# Patient Record
Sex: MALE | Employment: OTHER | URBAN - METROPOLITAN AREA
[De-identification: names, ages, dates, MRNs, and addresses within clinical notes are randomized per-mention and may not be internally consistent; named-entity substitution may affect disease eponyms.]

---

## 2023-11-05 ENCOUNTER — APPOINTMENT (OUTPATIENT)
Dept: CT IMAGING | Age: 88
DRG: 101 | End: 2023-11-05
Payer: MEDICARE

## 2023-11-05 ENCOUNTER — HOSPITAL ENCOUNTER (INPATIENT)
Age: 88
LOS: 1 days | Discharge: HOME | DRG: 101 | End: 2023-11-06
Attending: EMERGENCY MEDICINE | Admitting: HOSPITALIST
Payer: MEDICARE

## 2023-11-05 DIAGNOSIS — R56.9 POSTICTAL STATE (HCC): ICD-10-CM

## 2023-11-05 DIAGNOSIS — G31.9 DIFFUSE CEREBRAL ATROPHY (HCC): ICD-10-CM

## 2023-11-05 DIAGNOSIS — I67.89 CEREBRAL MICROVASCULAR DISEASE: ICD-10-CM

## 2023-11-05 DIAGNOSIS — R55 SYNCOPE AND COLLAPSE: Primary | ICD-10-CM

## 2023-11-05 DIAGNOSIS — R56.9 NEW ONSET SEIZURE (HCC): ICD-10-CM

## 2023-11-05 LAB
ALBUMIN SERPL-MCNC: 4.4 G/DL (ref 3.5–5.2)
ALBUMIN/GLOB SERPL: 1.6 {RATIO} (ref 1–2.5)
ALP SERPL-CCNC: 65 U/L (ref 40–129)
ALT SERPL-CCNC: 15 U/L (ref 5–41)
ANION GAP SERPL CALCULATED.3IONS-SCNC: 15 MMOL/L (ref 9–17)
AST SERPL-CCNC: 19 U/L
BACTERIA URNS QL MICRO: ABNORMAL
BASOPHILS # BLD: 0 K/UL (ref 0–0.2)
BASOPHILS NFR BLD: 0 % (ref 0–2)
BILIRUB SERPL-MCNC: 0.8 MG/DL (ref 0.3–1.2)
BILIRUB UR QL STRIP: NEGATIVE
BUN SERPL-MCNC: 17 MG/DL (ref 8–23)
CALCIUM SERPL-MCNC: 9.5 MG/DL (ref 8.6–10.4)
CHARACTER UR: ABNORMAL
CHLORIDE SERPL-SCNC: 104 MMOL/L (ref 98–107)
CLARITY UR: CLEAR
CO2 SERPL-SCNC: 25 MMOL/L (ref 20–31)
COLOR UR: YELLOW
CREAT SERPL-MCNC: 1 MG/DL (ref 0.7–1.2)
EOSINOPHIL # BLD: 0.1 K/UL (ref 0–0.4)
EOSINOPHILS RELATIVE PERCENT: 2 % (ref 1–4)
EPI CELLS #/AREA URNS HPF: ABNORMAL /HPF (ref 0–5)
ERYTHROCYTE [DISTWIDTH] IN BLOOD BY AUTOMATED COUNT: 13.7 % (ref 12.5–15.4)
GFR SERPL CREATININE-BSD FRML MDRD: >60 ML/MIN/1.73M2
GLUCOSE BLD-MCNC: 131 MG/DL (ref 75–110)
GLUCOSE SERPL-MCNC: 179 MG/DL (ref 70–99)
GLUCOSE UR STRIP-MCNC: ABNORMAL MG/DL
HCT VFR BLD AUTO: 40 % (ref 41–53)
HGB BLD-MCNC: 13.4 G/DL (ref 13.5–17.5)
HGB UR QL STRIP.AUTO: NEGATIVE
KETONES UR STRIP-MCNC: ABNORMAL MG/DL
LEUKOCYTE ESTERASE UR QL STRIP: NEGATIVE
LYMPHOCYTES NFR BLD: 3.1 K/UL (ref 1–4.8)
LYMPHOCYTES RELATIVE PERCENT: 39 % (ref 24–44)
MCH RBC QN AUTO: 31.8 PG (ref 26–34)
MCHC RBC AUTO-ENTMCNC: 33.5 G/DL (ref 31–37)
MCV RBC AUTO: 95 FL (ref 80–100)
MONOCYTES NFR BLD: 0.6 K/UL (ref 0.1–1.2)
MONOCYTES NFR BLD: 7 % (ref 2–11)
NEUTROPHILS NFR BLD: 52 % (ref 36–66)
NEUTS SEG NFR BLD: 4.1 K/UL (ref 1.8–7.7)
NITRITE UR QL STRIP: NEGATIVE
PH UR STRIP: 6 [PH] (ref 5–8)
PLATELET # BLD AUTO: 163 K/UL (ref 140–450)
PMV BLD AUTO: 9.3 FL (ref 6–12)
POTASSIUM SERPL-SCNC: 3.9 MMOL/L (ref 3.7–5.3)
PROT SERPL-MCNC: 7.2 G/DL (ref 6.4–8.3)
PROT UR STRIP-MCNC: ABNORMAL MG/DL
RBC # BLD AUTO: 4.21 M/UL (ref 4.5–5.9)
RBC #/AREA URNS HPF: ABNORMAL /HPF (ref 0–2)
SODIUM SERPL-SCNC: 144 MMOL/L (ref 135–144)
SP GR UR STRIP: 1.01 (ref 1–1.03)
TROPONIN I SERPL HS-MCNC: 10 NG/L (ref 0–22)
TSH SERPL DL<=0.05 MIU/L-ACNC: 6.01 UIU/ML (ref 0.3–5)
UROBILINOGEN UR STRIP-ACNC: NORMAL EU/DL (ref 0–1)
WBC #/AREA URNS HPF: ABNORMAL /HPF (ref 0–5)
WBC OTHER # BLD: 7.9 K/UL (ref 3.5–11)

## 2023-11-05 PROCEDURE — 82947 ASSAY GLUCOSE BLOOD QUANT: CPT

## 2023-11-05 PROCEDURE — 70450 CT HEAD/BRAIN W/O DYE: CPT

## 2023-11-05 PROCEDURE — 2060000000 HC ICU INTERMEDIATE R&B

## 2023-11-05 PROCEDURE — 84443 ASSAY THYROID STIM HORMONE: CPT

## 2023-11-05 PROCEDURE — 36415 COLL VENOUS BLD VENIPUNCTURE: CPT

## 2023-11-05 PROCEDURE — 84484 ASSAY OF TROPONIN QUANT: CPT

## 2023-11-05 PROCEDURE — 99285 EMERGENCY DEPT VISIT HI MDM: CPT

## 2023-11-05 PROCEDURE — 2580000003 HC RX 258: Performed by: NURSE PRACTITIONER

## 2023-11-05 PROCEDURE — 6360000004 HC RX CONTRAST MEDICATION: Performed by: EMERGENCY MEDICINE

## 2023-11-05 PROCEDURE — 84439 ASSAY OF FREE THYROXINE: CPT

## 2023-11-05 PROCEDURE — 2580000003 HC RX 258: Performed by: HOSPITALIST

## 2023-11-05 PROCEDURE — 81001 URINALYSIS AUTO W/SCOPE: CPT

## 2023-11-05 PROCEDURE — 2580000003 HC RX 258: Performed by: EMERGENCY MEDICINE

## 2023-11-05 PROCEDURE — 80053 COMPREHEN METABOLIC PANEL: CPT

## 2023-11-05 PROCEDURE — 96374 THER/PROPH/DIAG INJ IV PUSH: CPT

## 2023-11-05 PROCEDURE — 71260 CT THORAX DX C+: CPT

## 2023-11-05 PROCEDURE — 93005 ELECTROCARDIOGRAM TRACING: CPT | Performed by: NURSE PRACTITIONER

## 2023-11-05 PROCEDURE — 85025 COMPLETE CBC W/AUTO DIFF WBC: CPT

## 2023-11-05 PROCEDURE — 6360000002 HC RX W HCPCS: Performed by: HOSPITALIST

## 2023-11-05 PROCEDURE — 6360000002 HC RX W HCPCS: Performed by: NURSE PRACTITIONER

## 2023-11-05 RX ORDER — BICALUTAMIDE 50 MG/1
50 TABLET, FILM COATED ORAL DAILY
Status: DISCONTINUED | OUTPATIENT
Start: 2023-11-06 | End: 2023-11-07 | Stop reason: HOSPADM

## 2023-11-05 RX ORDER — SODIUM CHLORIDE 0.9 % (FLUSH) 0.9 %
10 SYRINGE (ML) INJECTION ONCE
Status: COMPLETED | OUTPATIENT
Start: 2023-11-05 | End: 2023-11-05

## 2023-11-05 RX ORDER — ONDANSETRON 4 MG/1
4 TABLET, ORALLY DISINTEGRATING ORAL EVERY 8 HOURS PRN
Status: DISCONTINUED | OUTPATIENT
Start: 2023-11-05 | End: 2023-11-07 | Stop reason: HOSPADM

## 2023-11-05 RX ORDER — ENOXAPARIN SODIUM 100 MG/ML
40 INJECTION SUBCUTANEOUS DAILY
Status: DISCONTINUED | OUTPATIENT
Start: 2023-11-05 | End: 2023-11-07 | Stop reason: HOSPADM

## 2023-11-05 RX ORDER — SODIUM CHLORIDE 9 MG/ML
INJECTION, SOLUTION INTRAVENOUS PRN
Status: DISCONTINUED | OUTPATIENT
Start: 2023-11-05 | End: 2023-11-07 | Stop reason: HOSPADM

## 2023-11-05 RX ORDER — MAGNESIUM SULFATE IN WATER 40 MG/ML
2000 INJECTION, SOLUTION INTRAVENOUS PRN
Status: DISCONTINUED | OUTPATIENT
Start: 2023-11-05 | End: 2023-11-07 | Stop reason: HOSPADM

## 2023-11-05 RX ORDER — POTASSIUM CHLORIDE 7.45 MG/ML
10 INJECTION INTRAVENOUS PRN
Status: DISCONTINUED | OUTPATIENT
Start: 2023-11-05 | End: 2023-11-07 | Stop reason: HOSPADM

## 2023-11-05 RX ORDER — POTASSIUM CHLORIDE 20 MEQ/1
40 TABLET, EXTENDED RELEASE ORAL PRN
Status: DISCONTINUED | OUTPATIENT
Start: 2023-11-05 | End: 2023-11-07 | Stop reason: HOSPADM

## 2023-11-05 RX ORDER — POLYETHYLENE GLYCOL 3350 17 G/17G
17 POWDER, FOR SOLUTION ORAL DAILY PRN
Status: DISCONTINUED | OUTPATIENT
Start: 2023-11-05 | End: 2023-11-07 | Stop reason: HOSPADM

## 2023-11-05 RX ORDER — ONDANSETRON 2 MG/ML
4 INJECTION INTRAMUSCULAR; INTRAVENOUS EVERY 6 HOURS PRN
Status: DISCONTINUED | OUTPATIENT
Start: 2023-11-05 | End: 2023-11-07 | Stop reason: HOSPADM

## 2023-11-05 RX ORDER — SODIUM CHLORIDE 0.9 % (FLUSH) 0.9 %
5-40 SYRINGE (ML) INJECTION PRN
Status: DISCONTINUED | OUTPATIENT
Start: 2023-11-05 | End: 2023-11-07 | Stop reason: HOSPADM

## 2023-11-05 RX ORDER — LORAZEPAM 2 MG/ML
1 INJECTION INTRAMUSCULAR EVERY 5 MIN PRN
Status: DISCONTINUED | OUTPATIENT
Start: 2023-11-05 | End: 2023-11-07 | Stop reason: HOSPADM

## 2023-11-05 RX ORDER — 0.9 % SODIUM CHLORIDE 0.9 %
80 INTRAVENOUS SOLUTION INTRAVENOUS ONCE
Status: DISCONTINUED | OUTPATIENT
Start: 2023-11-05 | End: 2023-11-07 | Stop reason: HOSPADM

## 2023-11-05 RX ORDER — 0.9 % SODIUM CHLORIDE 0.9 %
500 INTRAVENOUS SOLUTION INTRAVENOUS ONCE
Status: COMPLETED | OUTPATIENT
Start: 2023-11-05 | End: 2023-11-05

## 2023-11-05 RX ORDER — ONDANSETRON 2 MG/ML
4 INJECTION INTRAMUSCULAR; INTRAVENOUS ONCE
Status: COMPLETED | OUTPATIENT
Start: 2023-11-05 | End: 2023-11-05

## 2023-11-05 RX ORDER — BICALUTAMIDE 50 MG/1
TABLET, FILM COATED ORAL
COMMUNITY
Start: 2021-08-30

## 2023-11-05 RX ORDER — SODIUM CHLORIDE 0.9 % (FLUSH) 0.9 %
5-40 SYRINGE (ML) INJECTION EVERY 12 HOURS SCHEDULED
Status: DISCONTINUED | OUTPATIENT
Start: 2023-11-05 | End: 2023-11-07 | Stop reason: HOSPADM

## 2023-11-05 RX ORDER — ACETAMINOPHEN 325 MG/1
650 TABLET ORAL EVERY 6 HOURS PRN
Status: DISCONTINUED | OUTPATIENT
Start: 2023-11-05 | End: 2023-11-07 | Stop reason: HOSPADM

## 2023-11-05 RX ORDER — ACETAMINOPHEN 650 MG/1
650 SUPPOSITORY RECTAL EVERY 6 HOURS PRN
Status: DISCONTINUED | OUTPATIENT
Start: 2023-11-05 | End: 2023-11-07 | Stop reason: HOSPADM

## 2023-11-05 RX ADMIN — IOPAMIDOL 75 ML: 755 INJECTION, SOLUTION INTRAVENOUS at 15:53

## 2023-11-05 RX ADMIN — SODIUM CHLORIDE 500 ML: 9 INJECTION, SOLUTION INTRAVENOUS at 14:30

## 2023-11-05 RX ADMIN — SODIUM CHLORIDE, PRESERVATIVE FREE 10 ML: 5 INJECTION INTRAVENOUS at 19:48

## 2023-11-05 RX ADMIN — Medication 80 ML: at 15:53

## 2023-11-05 RX ADMIN — SODIUM CHLORIDE, PRESERVATIVE FREE 10 ML: 5 INJECTION INTRAVENOUS at 15:53

## 2023-11-05 RX ADMIN — ONDANSETRON 4 MG: 2 INJECTION INTRAMUSCULAR; INTRAVENOUS at 14:40

## 2023-11-05 RX ADMIN — ENOXAPARIN SODIUM 40 MG: 100 INJECTION SUBCUTANEOUS at 19:52

## 2023-11-05 ASSESSMENT — ENCOUNTER SYMPTOMS
NAUSEA: 0
ABDOMINAL PAIN: 0
DIARRHEA: 0
SHORTNESS OF BREATH: 0
VOMITING: 0

## 2023-11-05 ASSESSMENT — PAIN - FUNCTIONAL ASSESSMENT: PAIN_FUNCTIONAL_ASSESSMENT: NONE - DENIES PAIN

## 2023-11-05 NOTE — ED PROVIDER NOTES
[address] [facility] Emergency Department  [address] [address] [facility] [address]  Phone: [phone]  Fax: [phone]      Attending Physician Attestation    I performed a history and physical examination of the patient and discussed management with the mid level provider. I reviewed the mid level provider's note and agree with the documented findings and plan of care. Any areas of disagreement are noted on the chart. I was personally present for the key portions of any procedures. I have documented in the chart those procedures where I was not present during the key portions. I have reviewed the emergency nurses triage note. I agree with the chief complaint, past medical history, past surgical history, allergies, medications, social and family history as documented unless otherwise noted below. Documentation of the HPI, Physical Exam and Medical Decision Making performed by mid level providers is based on my personal performance of the HPI, PE and MDM. For Physician Assistant/ Nurse Practitioner cases/documentation I have personally evaluated this patient and have completed at least one if not all key elements of the E/M (history, physical exam, and MDM). Additional findings are as noted. CHIEF COMPLAINT       Chief Complaint   Patient presents with    Altered Mental Status    Seizures     Possible unwitnessed seizure         HISTORY OF PRESENT ILLNESS    Efren Desai is a 81 y.o. male who presents to the emergency department today after he was found unresponsive. Patient does not recall what happened. EMS thought that the patient may have had a seizure as there was some foaming material around his mouth. He is never had an episode like this in the past.  He denies any pain or injury or trauma. He was found by his wife. PAST MEDICAL HISTORY    has no past medical history on file. SURGICAL HISTORY      has no past surgical history on file.     CURRENT MEDICATIONS
List          DONTAE Arnett CNP   Emergency Medicine Nurse Practitioner    (Please note that portions of this note were completed with a voice recognitionprogram.  Efforts were made to edit the dictations but occasionally words are mis-transcribed.)       DONTAE Arnett CNP  11/05/23 6390

## 2023-11-06 ENCOUNTER — APPOINTMENT (OUTPATIENT)
Dept: MRI IMAGING | Age: 88
DRG: 101 | End: 2023-11-06
Payer: MEDICARE

## 2023-11-06 VITALS
DIASTOLIC BLOOD PRESSURE: 61 MMHG | TEMPERATURE: 97 F | RESPIRATION RATE: 16 BRPM | HEIGHT: 66 IN | HEART RATE: 78 BPM | OXYGEN SATURATION: 97 % | SYSTOLIC BLOOD PRESSURE: 132 MMHG | BODY MASS INDEX: 23.92 KG/M2 | WEIGHT: 148.81 LBS

## 2023-11-06 PROBLEM — I67.89 CEREBRAL MICROVASCULAR DISEASE: Status: ACTIVE | Noted: 2023-11-06

## 2023-11-06 PROBLEM — R40.4 UNRESPONSIVE EPISODE: Status: ACTIVE | Noted: 2023-11-06

## 2023-11-06 PROBLEM — R56.9 POSTICTAL STATE (HCC): Status: ACTIVE | Noted: 2023-11-06

## 2023-11-06 PROBLEM — G31.9 DIFFUSE CEREBRAL ATROPHY (HCC): Status: ACTIVE | Noted: 2023-11-06

## 2023-11-06 LAB
CHOLEST SERPL-MCNC: 173 MG/DL
CHOLESTEROL/HDL RATIO: 3
EKG ATRIAL RATE: 80 BPM
EKG P AXIS: 58 DEGREES
EKG P-R INTERVAL: 252 MS
EKG Q-T INTERVAL: 400 MS
EKG QRS DURATION: 96 MS
EKG QTC CALCULATION (BAZETT): 461 MS
EKG R AXIS: -12 DEGREES
EKG T AXIS: 59 DEGREES
EKG VENTRICULAR RATE: 80 BPM
EST. AVERAGE GLUCOSE BLD GHB EST-MCNC: 114 MG/DL
HBA1C MFR BLD: 5.6 % (ref 4–6)
HDLC SERPL-MCNC: 57 MG/DL
LDLC SERPL CALC-MCNC: 101 MG/DL (ref 0–130)
TRIGL SERPL-MCNC: 75 MG/DL

## 2023-11-06 PROCEDURE — 2580000003 HC RX 258: Performed by: HOSPITALIST

## 2023-11-06 PROCEDURE — 70553 MRI BRAIN STEM W/O & W/DYE: CPT

## 2023-11-06 PROCEDURE — 95819 EEG AWAKE AND ASLEEP: CPT

## 2023-11-06 PROCEDURE — 99222 1ST HOSP IP/OBS MODERATE 55: CPT | Performed by: STUDENT IN AN ORGANIZED HEALTH CARE EDUCATION/TRAINING PROGRAM

## 2023-11-06 PROCEDURE — 80061 LIPID PANEL: CPT

## 2023-11-06 PROCEDURE — 6370000000 HC RX 637 (ALT 250 FOR IP): Performed by: HOSPITALIST

## 2023-11-06 PROCEDURE — 36415 COLL VENOUS BLD VENIPUNCTURE: CPT

## 2023-11-06 PROCEDURE — 6360000002 HC RX W HCPCS: Performed by: HOSPITALIST

## 2023-11-06 PROCEDURE — A9579 GAD-BASE MR CONTRAST NOS,1ML: HCPCS | Performed by: HOSPITALIST

## 2023-11-06 PROCEDURE — 99222 1ST HOSP IP/OBS MODERATE 55: CPT | Performed by: HOSPITALIST

## 2023-11-06 PROCEDURE — 83036 HEMOGLOBIN GLYCOSYLATED A1C: CPT

## 2023-11-06 PROCEDURE — 95816 EEG AWAKE AND DROWSY: CPT | Performed by: PSYCHIATRY & NEUROLOGY

## 2023-11-06 PROCEDURE — 6360000004 HC RX CONTRAST MEDICATION: Performed by: HOSPITALIST

## 2023-11-06 PROCEDURE — 2060000000 HC ICU INTERMEDIATE R&B

## 2023-11-06 RX ORDER — SODIUM CHLORIDE 0.9 % (FLUSH) 0.9 %
10 SYRINGE (ML) INJECTION PRN
Status: DISCONTINUED | OUTPATIENT
Start: 2023-11-06 | End: 2023-11-07 | Stop reason: HOSPADM

## 2023-11-06 RX ADMIN — ENOXAPARIN SODIUM 40 MG: 100 INJECTION SUBCUTANEOUS at 08:28

## 2023-11-06 RX ADMIN — BICALUTAMIDE 50 MG: 50 TABLET, FILM COATED ORAL at 08:28

## 2023-11-06 RX ADMIN — SODIUM CHLORIDE, PRESERVATIVE FREE 10 ML: 5 INJECTION INTRAVENOUS at 08:28

## 2023-11-06 RX ADMIN — GADOTERIDOL 14 ML: 279.3 INJECTION, SOLUTION INTRAVENOUS at 11:28

## 2023-11-06 NOTE — CARE COORDINATION
Case Management Assessment  Initial Evaluation    Date/Time of Evaluation: 11/6/2023 12:44 PM  Assessment Completed by: Rochelle Kramer RN    If patient is discharged prior to next notation, then this note serves as note for discharge by case management. Patient Name: Karen Hanna                   YOB: 1935  Diagnosis: Syncope and collapse [R55]  New onset seizure [facility]) [R56.9]                   Date / Time: 11/5/2023  2:20 PM    Patient Admission Status: Inpatient   Readmission Risk (Low < 19, Mod (19-27), High > 27): Readmission Risk Score: 6.2    Current PCP: No primary care provider on file. PCP verified by CM? Yes    Chart Reviewed: Yes      History Provided by: Patient  Patient Orientation: Alert and Oriented    Patient Cognition: Alert    Hospitalization in the last 30 days (Readmission):  No    If yes, Readmission Assessment in CM Navigator will be completed. Advance Directives:      Code Status: Full Code   Patient's Primary Decision Maker is: Legal Next of Kin      Discharge Planning:    Patient lives with: Spouse/Significant Other Type of Home: House  Primary Care Giver: Self  Patient Support Systems include: Spouse/Significant Other, Children   Current Financial resources: Medicare  Current community resources: None  Current services prior to admission: None            Current DME:              Type of Home Care services:  None    ADLS  Prior functional level: Independent in ADLs/IADLs  Current functional level: Independent in ADLs/IADLs    PT AM-PAC:   /24  OT AM-PAC:   /24    Family can provide assistance at DC: Yes  Would you like Case Management to discuss the discharge plan with any other family members/significant others, and if so, who?  No  Plans to Return to Present Housing: Yes  Other Identified Issues/Barriers to RETURNING to current housing: None  Potential Assistance needed at discharge: N/A            Potential DME:    Patient expects to discharge to: [address] [place] [address]

## 2023-11-06 NOTE — H&P
[facility]  Office: [id number], DO, Poonam Platte, DO, Salina Me, DO, Oralee Fondjovanny Blood, DO, Reema Lyn MD, Cathy Marroquin MD, Shawn Neff MD, Ella Sigala MD,  Gayle Hale MD, Lissette Jeong MD, Nadine Ramírez MD,  Rowena Mcintyre MD, Mesha Engel MD, Hector Pinzon DO, Jamil Robbins MD,  Mariluz Thibodeaux DO, Bashir Ta MD, Mauri Leonardo MD, Guillermina Aguirre MD, Eddie Mccartney MD,  Naty Chavis MD, Deborah Diaz MD, Radha Carter MD, Irene Garcia MD, Harsh Bennett MD, Misael Ta, DO, Bri Dawson DO, Heidy Delcid MD,  Haylie Trujillo MD, Saundra Urena, CNP,  Kayla Andrews, CNP, Chico Jovel, CNP,  Quinton Rivero, [facility], Dav Sepulveda, CNP, Samy Decker, CNP, Heidi Shelton, CNP, Moncho Mullen, CNP, Suraj Cullen, CNP, Nubia Carlson, CNP, Juliana Savage, CNS, Joslyn Ewing, [facility], Terry Toscano[address]    HISTORY AND PHYSICAL EXAMINATION            Date:   11/6/2023  Patient name:  Efren Desai  Date of admission:  11/5/2023  2:20 PM  MRN:   [id number]  Account:  [de-identified]  YOB: 1935  PCP:    No primary care provider on file. Room:   [address] [place] [address]  Code Status:    Full Code      History Obtained From:     patient, electronic medical record    History of Present Illness:     Efren Desai is a 81 y.o. Unavailable / unknown male who presents with Altered Mental Status and Seizures (Possible unwitnessed seizure)   and is admitted to the hospital for the management of New onset seizure ([address]). This very pleasant 64-year-old male was brought to the hospital due to unresponsive episode. The patient had gone to [employer] and completed lunch with his wife. He states that the last thing he remembers was sitting down in his chair to relax. The patient's wife reportedly checked on the patient and found him \"foaming at the mouth\".   The patient has

## 2023-11-06 NOTE — PLAN OF CARE
Problem: Discharge Planning  Goal: Discharge to home or other facility with appropriate resources  11/6/2023 0425 by Jose Mendez RN  Outcome: Progressing  11/6/2023 0143 by Jose Mendez, RN  Outcome: Progressing     Problem: Safety - Adult  Goal: Free from fall injury  11/6/2023 0425 by Jose Mendez RN  Outcome: Progressing  11/6/2023 0143 by Jose Mendez, RN  Outcome: Progressing     Problem: ABCDS Injury Assessment  Goal: Absence of physical injury  11/6/2023 0425 by Jose Mendez RN  Outcome: Progressing  11/6/2023 0143 by Jose Mendez RN  Outcome: Progressing

## 2023-11-06 NOTE — ED NOTES
Pt bed linens change, brief changed, warm blankets provided          Terry Leonard RN  11/05/23 4627

## 2023-11-06 NOTE — CONSULTS
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  Question of undiagnosed sleep apnea and hypertension at home not formally worked up or diagnosed prior to this admission   Subclinical hypothyroidism   History of prostate cancer on Anti-androgen Chemotherapy     Plan:     Patient status Admit as inpatient in the  Progressive Unit/Step down    Concern for new onset seizure  -CT head WO reviewed  -FU MRI brain W/WO epilepsy protocal  -FU 30 minute routine EEG   -If EEG WNL I had extensive discussion with patient and given single unprovoked seizure we will continue to follow him and hold off on ASD at this time unless he has an additional event. Discussed no driving for 6 months and close outpatient FU. If EEG shows any focal epileptiform activity then patient is agreeable to starting keppra 500mg BID with close FU.      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  -would benefit from daily aspirin 81mg at discharge   -Statin therapy with LDL goal <70 if LDL>70 once resulted then discharge home on Crestor 20mg nightly   -FU HBA1C -pending goal <7   -SBP goal <140, Discussion with patient to start taking his blood pressure in the morning and evening daily for next few weeks and if consistently >140 would have his PCP start him on BP medication in the near future   -outpatient sleep study for concern of undiagnosed Sleep apnea     Subclinical Hypothyroidism  -TSH 11/5/23-->6.01  -Free T4 pending   -will defer synthroid or further workup to primary medicine team       PT/OT      Patient will need outpatient neurology follow up placed in his chart for formal MCI/Dementia workup outpatient     Consultations:   [address]      Follow-up further recommendations after discussing the case with attending  The plan was discussed with the

## 2023-11-07 LAB — T4 FREE SERPL-MCNC: 1.3 NG/DL (ref 0.9–1.7)

## 2023-11-07 NOTE — PROCEDURES
[address] [facility], [facility]                          ELECTROENCEPHALOGRAM REPORT    PATIENT NAME: Angela Perry                    :        1935  MED REC NO:   [id number]                             ROOM:       [facility]  ACCOUNT NO:   [de-identified]                           ADMIT DATE: 2023  PROVIDER:     Andre Persaud    DATE OF EE2023    HISTORY:  This is an 52-year-old male with history of prostate cancer,  presented with unresponsive episode, suspected new onset seizures. MEDICATIONS:  Lorazepam as needed. DESCRIPTION OF THE PROCEDURE:  Electrodes were applied using paste in  positions dictated by the international 10-20 system of placement. Reviewing montages included both referential and the bipolar  derivations. In addition to EEG data, EKG and eye movements and photic  stimulation were recorded. This is a routine recording. This test was  performed on 2023. EEG FINDINGS: At the onset of the study, the patient is awake and during wakefulness  there are occasional runs of 7 - 8 Hz theta activity with significant  movement artifacts, electrode artifacts for majority of the study. Hyperventilation was performed with fade effort induces moderate buildup  of bilateral slow-wave activity. Photic stimulation did not induce  posterior driving responses. Sleep is not recorded. Electrocardiogram  montage revealed artifacts. ELECTRODIAGNOSTIC INTERPRETATION:  This EEG performed during wakefulness  is significantly contaminated with moment artifacts, electrode artifacts  making interpretation limited. Sleep is not recorded. Epileptiform  discharges cannot be excluded through these artifacts. Recommend a  repeat study under sleep deprivation if clinically warranted.         Andre Persaud    D: 2023 19:31:46       T: 2023 21:51:01     [email or web address]  Job#:

## 2023-11-07 NOTE — DISCHARGE SUMMARY
acute pulmonary embolism. 2. Mild main pulmonary artery dilatation can be seen with pulmonary hypertension but is nonspecific. 3. Minimal bibasilar atelectasis. 4. Nonspecific asymmetric elevation left hemidiaphragm, hemidiaphragm paresis or paralysis are considerations. May consider nonemergent fluoroscopic sniff test evaluation. 5. Cardiomegaly. CT HEAD WO CONTRAST    Result Date: 11/5/2023  No acute intracranial abnormality Cerebral atrophy Hypoattenuating white matter disease, nonspecific, however most often related to chronic microvascular ischemic changes. Consultations:    Consults:     Final Specialist Recommendations/Findings:   IP CONSULT TO NEUROLOGY      The patient was seen and examined on day of discharge and this discharge summary is in conjunction with any daily progress note from day of discharge. Discharge plan:     Disposition: Home    Physician Follow Up:   Neetu Zabala MD  [address] [place] [phone]    Follow up in 4 week(s)  FU seizure workup and formal MCI/Dementia workup outpatient       Requiring Further Evaluation/Follow Up POST HOSPITALIZATION/Incidental Findings: Follow-up with neurology as instructed    Diet: regular diet    Activity: As tolerated    Instructions to Patient: None    Discharge Medications:      Medication List        CONTINUE taking these medications      bicalutamide 50 MG chemo tablet  Commonly known as: CASODEX              Time spent on discharge is 20 mins in patient examination, evaluation, counseling as well as medication reconciliation, prescriptions for required medications, discharge plan and follow up. Electronically signed by   Annika Caldwell DO  11/6/2023  8:23 PM      Thank you Dr. Claudette Smith primary care provider on file. for the opportunity to be involved in this patient's care.

## 2023-11-07 NOTE — PROGRESS NOTES
NEUROLOGY INPATIENT EEG  11/6/2023           This EEG performed predominantly during wakefulness and brief period of drowsiness is abnormal with significantly contaminated background activity making interpretation limited. Raymundo-wave discharges cannot be excluded through these artifacts. Sleep is not recorded. During brief period of drowsiness, the study did not demonstrate any ongoing electrographic seizure activity. Recommend a repeat study under sedation/sleep if clinically warranted.       Addy Faith MD 11/6/2023 7:34 PM

## 2023-11-07 NOTE — FLOWSHEET NOTE
Discharge paper work reviewed with pt and his spouse. Both verbalized understanding. Pt has no questions. Pt taken to car via wheelchair.  [address]

## 2023-11-10 PROBLEM — R55 SYNCOPE AND COLLAPSE: Status: ACTIVE | Noted: 2023-11-10

## 2024-01-24 ENCOUNTER — OFFICE VISIT (OUTPATIENT)
Dept: NEUROLOGY | Age: 89
End: 2024-01-24
Payer: MEDICARE

## 2024-01-24 VITALS
SYSTOLIC BLOOD PRESSURE: 127 MMHG | DIASTOLIC BLOOD PRESSURE: 69 MMHG | BODY MASS INDEX: 24.17 KG/M2 | HEIGHT: 66 IN | WEIGHT: 150.4 LBS | HEART RATE: 80 BPM

## 2024-01-24 DIAGNOSIS — R56.9 NEW ONSET SEIZURE (HCC): Primary | ICD-10-CM

## 2024-01-24 PROCEDURE — 1036F TOBACCO NON-USER: CPT | Performed by: PSYCHIATRY & NEUROLOGY

## 2024-01-24 PROCEDURE — G8420 CALC BMI NORM PARAMETERS: HCPCS | Performed by: PSYCHIATRY & NEUROLOGY

## 2024-01-24 PROCEDURE — G8427 DOCREV CUR MEDS BY ELIG CLIN: HCPCS | Performed by: PSYCHIATRY & NEUROLOGY

## 2024-01-24 PROCEDURE — 1123F ACP DISCUSS/DSCN MKR DOCD: CPT | Performed by: PSYCHIATRY & NEUROLOGY

## 2024-01-24 PROCEDURE — G8484 FLU IMMUNIZE NO ADMIN: HCPCS | Performed by: PSYCHIATRY & NEUROLOGY

## 2024-01-24 PROCEDURE — 99204 OFFICE O/P NEW MOD 45 MIN: CPT | Performed by: PSYCHIATRY & NEUROLOGY

## 2024-01-24 RX ORDER — LEVETIRACETAM 500 MG/1
500 TABLET ORAL 2 TIMES DAILY
Qty: 60 TABLET | Refills: 3 | Status: SHIPPED | OUTPATIENT
Start: 2024-01-24

## 2024-01-24 NOTE — PROGRESS NOTES
NEUROLOGY CONSULT  Patient Name:       Shiva Vance  :        1935  Clinic Visit Date:    2024        Dear Dr. Rizzo, Soy JENKINS MD     I had the opportunity to see your patient, Mr. Shiva Vance in neurology consultation today.   As you know he  is a 88 y.o.  male with recent hosp on 2023 with concern for new onset seizures.  Patient was evaluated by neurologist on-call and has had MRI brain (with/without) on 2023 demonstrating no acute intracranial abnormalities. Only demonstrated moderate chronic microvascular disease within periventricular white matter.  Patient had EEG on 2023 and it demonstrated significantly contaminated background making interpretation limited.  Therefore repeat study under sedation was recommended.  Today patient presented to the clinic for follow-up stating that patient did not have any recurrence of similar spell raising concern for seizures.   Patient presented to the clinic accompanied by his wife stating that he was taken to ER on 2023 from home via EMS after being found by her unresponsive with concern of postictal state with foam around his mouth.  Patient was exhausted afterwards.  Admits to soreness but does not recall having tongue bite.  Patient does not have complete recollection of these events.  Patient had history of prostate cancer and was on chemo therapy in the past.      Course of hospitalization at [facility] on 2023:  The patient is a 87 y.o.  Right handed   male who initially presented to [facility] Emergency department 23 from home via EMS after found by his wife unresponsive and concern for being postictal with foam around his mouth. Patient does not appear to have any significant PMH within our system however Care Everywhere significant for prostate cancer since  on chemo, bilateral hip replacements previously, Dry eye syndrome, posterior vitreous detachment of both eyes s/p bilateral

## 2024-02-09 DIAGNOSIS — R56.9 NEW ONSET SEIZURE (HCC): ICD-10-CM

## 2024-02-09 NOTE — RESULT ENCOUNTER NOTE
Please let the patient know that greater than 1 hour EEG study done on 1/25/2024 did not show any abnormalities.  Will discuss more during upcoming follow-up visit.  Thank you.   -dr. shafer

## 2024-05-01 ENCOUNTER — HOSPITAL ENCOUNTER (OUTPATIENT)
Age: 89
Setting detail: SPECIMEN
Discharge: HOME | End: 2024-05-01

## 2024-05-01 ENCOUNTER — OFFICE VISIT (OUTPATIENT)
Dept: NEUROLOGY | Age: 89
End: 2024-05-01
Payer: MEDICARE

## 2024-05-01 VITALS
BODY MASS INDEX: 24.4 KG/M2 | DIASTOLIC BLOOD PRESSURE: 68 MMHG | SYSTOLIC BLOOD PRESSURE: 132 MMHG | WEIGHT: 151.8 LBS | HEIGHT: 66 IN | HEART RATE: 67 BPM

## 2024-05-01 DIAGNOSIS — R56.9 NEW ONSET SEIZURE (HCC): ICD-10-CM

## 2024-05-01 DIAGNOSIS — R56.9 NEW ONSET SEIZURE (HCC): Primary | ICD-10-CM

## 2024-05-01 LAB — LEVETIRACETAM SERPL-MCNC: 23 UG/ML

## 2024-05-01 PROCEDURE — G8427 DOCREV CUR MEDS BY ELIG CLIN: HCPCS | Performed by: PSYCHIATRY & NEUROLOGY

## 2024-05-01 PROCEDURE — 1036F TOBACCO NON-USER: CPT | Performed by: PSYCHIATRY & NEUROLOGY

## 2024-05-01 PROCEDURE — G8420 CALC BMI NORM PARAMETERS: HCPCS | Performed by: PSYCHIATRY & NEUROLOGY

## 2024-05-01 PROCEDURE — 99214 OFFICE O/P EST MOD 30 MIN: CPT | Performed by: PSYCHIATRY & NEUROLOGY

## 2024-05-01 PROCEDURE — 1123F ACP DISCUSS/DSCN MKR DOCD: CPT | Performed by: PSYCHIATRY & NEUROLOGY

## 2024-05-01 RX ORDER — LEVETIRACETAM 500 MG/1
500 TABLET ORAL 2 TIMES DAILY
Qty: 180 TABLET | Refills: 2 | Status: SHIPPED | OUTPATIENT
Start: 2024-05-01

## 2024-05-01 NOTE — PROGRESS NOTES
NEUROLOGY FOLLOW-UP VISIT   Patient Name:       Shiva Vance  :        1935  Clinic Visit Date:    2024  LOV: 2024      Dear Soy Metz MD     I saw Mr. Shiva Vance in follow-up visit today in continuation of neurologic care.    As you know he  is a 88 y.o.  male initially seen in neurology consult on 2024 for new onset seizures since 2023. He has been on Keppra 500 bid and tolerating it well without adv effects.   Today's first follow-up visit  He was advised to get contrasted MRI brain and EEG and he presents to clinic for follow-up visit.    Patient is accompanied by his wife stating that he has been tolerating Her medication well without any adverse effects.  He offers no new complaints.        On initial visit on 2024:  Patient presented to clinic to follow-up on prior hosp on 2023 with concern for new onset seizures.  Patient was evaluated by neurologist on-call and has had MRI brain (with/without) on 2023 demonstrating no acute intracranial abnormalities. Only demonstrated moderate chronic microvascular disease within periventricular white matter.  Patient had EEG on 2023 and it demonstrated significantly contaminated background making interpretation limited.  Therefore repeat study under sedation was recommended.  Today patient presented to the clinic for follow-up stating that patient did not have any recurrence of similar spell raising concern for seizures.   Patient presented to the clinic accompanied by his wife stating that he was taken to ER on 2023 from home via EMS after being found by her unresponsive with concern of postictal state with foam around his mouth.  Patient was exhausted afterwards.  Admits to soreness but does not recall having tongue bite.  Patient does not have complete recollection of these events.  Patient had history of prostate cancer and was on chemo therapy in the past.      Course of hospitalization at

## 2024-09-10 DIAGNOSIS — R56.9 NEW ONSET SEIZURE (HCC): ICD-10-CM

## 2024-09-13 RX ORDER — LEVETIRACETAM 500 MG/1
500 TABLET ORAL 2 TIMES DAILY
Qty: 60 TABLET | Refills: 2 | Status: SHIPPED | OUTPATIENT
Start: 2024-09-13

## 2024-11-06 ENCOUNTER — OFFICE VISIT (OUTPATIENT)
Dept: NEUROLOGY | Age: 89
End: 2024-11-06

## 2024-11-06 VITALS
WEIGHT: 147 LBS | HEIGHT: 66 IN | BODY MASS INDEX: 23.63 KG/M2 | HEART RATE: 64 BPM | SYSTOLIC BLOOD PRESSURE: 156 MMHG | DIASTOLIC BLOOD PRESSURE: 81 MMHG

## 2024-11-06 DIAGNOSIS — R56.9 NEW ONSET SEIZURE (HCC): ICD-10-CM

## 2024-11-06 DIAGNOSIS — G40.909 SEIZURE DISORDER (HCC): Primary | ICD-10-CM

## 2024-11-06 RX ORDER — CICLOPIROX OLAMINE 7.7 MG/G
CREAM TOPICAL
COMMUNITY
Start: 2024-09-20

## 2024-11-06 RX ORDER — LEVETIRACETAM 500 MG/1
500 TABLET ORAL 2 TIMES DAILY
Qty: 180 TABLET | Refills: 3 | Status: SHIPPED | OUTPATIENT
Start: 2024-11-06

## 2024-11-06 RX ORDER — AMMONIUM LACTATE 12 G/100G
LOTION TOPICAL
COMMUNITY
Start: 2024-09-20

## 2024-11-06 NOTE — PROGRESS NOTES
male who initially presented to [facility] Emergency department 11/6/23 from home via EMS after found by his wife unresponsive and concern for being postictal with foam around his mouth. Patient does not appear to have any significant PMH within our system however Care Everywhere significant for prostate cancer since 2001 on chemo, bilateral hip replacements previously, Dry eye syndrome, posterior vitreous detachment of both eyes s/p bilateral cataract extraction. Patient states that he was at home yesterday when he had eaten lunch doing well denied anything out of the normal for him. He went to rest on his couch for a nap and then woke up after EMS had came for concern of probable seizure with post ictal state. Patient denies any previous history of seizures, no aura prior to this event. Patient denies any tobacco use, Alcohol or other recreational drugs. 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 This does not bother him.      Radiology Review and Neuro History:  -Patient has no previous neurologic history and never evaluated by our neurology group or outside groups in Care everywhere     Home meds Reviewed   -Bicalutamide (50mg chemo tablet for prostate cancer antiandrogen hormonal therapy)    MRI brain W/WO epilepsy protocal 11/6/23-->symmetric diffuse cerebral atrophy, fairly significant Small vessel disease otherwise I do not see any acute CVA or intracranial process.     CT head WO 11/5/23  IMPRESSION:  No acute intracranial abnormality  Cerebral atrophy  Hypo attenuating white matter disease, nonspecific, however most often related  to chronic microvascular ischemic changes      All items selected indicate a positive finding.    Those items not selected are negative.  Constitutional [] Weight loss/gain   [] Fatigue  [] Fever/Chills   HEENT []

## 2025-01-11 ENCOUNTER — HOSPITAL ENCOUNTER (EMERGENCY)
Age: 89
Discharge: HOME | End: 2025-01-11
Attending: EMERGENCY MEDICINE
Payer: MEDICARE

## 2025-01-11 VITALS
DIASTOLIC BLOOD PRESSURE: 71 MMHG | TEMPERATURE: 97.8 F | HEIGHT: 66 IN | SYSTOLIC BLOOD PRESSURE: 164 MMHG | WEIGHT: 150 LBS | BODY MASS INDEX: 24.11 KG/M2 | OXYGEN SATURATION: 98 % | RESPIRATION RATE: 18 BRPM | HEART RATE: 80 BPM

## 2025-01-11 DIAGNOSIS — R04.0 EPISTAXIS: Primary | ICD-10-CM

## 2025-01-11 PROCEDURE — 99282 EMERGENCY DEPT VISIT SF MDM: CPT

## 2025-01-11 PROCEDURE — 2500000003 HC RX 250 WO HCPCS: Performed by: EMERGENCY MEDICINE

## 2025-01-11 RX ADMIN — PHENYLEPHRINE HYDROCHLORIDE 1 SPRAY: 0.5 SPRAY NASAL at 13:00

## 2025-01-11 ASSESSMENT — PAIN - FUNCTIONAL ASSESSMENT: PAIN_FUNCTIONAL_ASSESSMENT: NONE - DENIES PAIN

## 2025-01-11 ASSESSMENT — LIFESTYLE VARIABLES
HOW OFTEN DO YOU HAVE A DRINK CONTAINING ALCOHOL: NEVER
HOW MANY STANDARD DRINKS CONTAINING ALCOHOL DO YOU HAVE ON A TYPICAL DAY: PATIENT DOES NOT DRINK

## 2025-01-11 NOTE — ED PROVIDER NOTES
respiratory distress.      Breath sounds: No wheezing.   Abdominal:      General: There is no distension.   Musculoskeletal:         General: No deformity or signs of injury.   Skin:     General: Skin is warm and dry.   Neurological:      General: No focal deficit present.      Mental Status: He is alert.      Gait: Gait normal.      Comments: No Facial asymmetry, speaking normal.          EMERGENCY DEPARTMENT COURSE and DIFFERENTIAL DIAGNOSIS/MDM:   Vitals:    Vitals:    01/11/25 1249   BP: (!) 164/71   Pulse: 80   Resp: 18   Temp: 97.8 °F (36.6 °C)   SpO2: 98%   Weight: 68 kg (150 lb)   Height: 1.676 m (5' 6\")       Patient presents to the emergency department with a complaint described above.  Bleeding has mostly resolved.  He has evidence of anterior epistaxis which I believe is likely secondary to dry membranes.  I have discussed this with him.  We blew out some clots, it was not actively bleeding, I put a clamp on it and I will do some Afrin and reassess      DIAGNOSTIC RESULTS     LABS:  Labs Reviewed - No data to display    All other labs were within normal range or not returned as of this dictation.    RADIOLOGY:  No orders to display         ED Course as of 01/11/25 1330   Sat Jan 11, 2025   1329 Upon reassessment, bleeding has stopped.  I talked to him about keeping it moist with a saline spray, giving him Afrin to use for today if needed, taught him how to clamp, talked about follow-up and what to return to ER for    At this time the patient is without objective evidence of an acute process requiring hospitalization or inpatient management. They have remained hemodynamically stable and are stable for discharge with outpatient follow-up.     Standard anticipatory guidance given to patient upon discharge.  Have given them a specific time frame in which to follow-up and who to follow-up with.  I have also advised them that they should return to the emergency department if they get worse, or not getting

## 2025-01-16 ENCOUNTER — HOSPITAL ENCOUNTER (EMERGENCY)
Age: 89
Discharge: HOME | End: 2025-01-16
Attending: STUDENT IN AN ORGANIZED HEALTH CARE EDUCATION/TRAINING PROGRAM
Payer: MEDICARE

## 2025-01-16 VITALS
SYSTOLIC BLOOD PRESSURE: 138 MMHG | OXYGEN SATURATION: 96 % | HEART RATE: 63 BPM | RESPIRATION RATE: 16 BRPM | BODY MASS INDEX: 24.11 KG/M2 | HEIGHT: 66 IN | WEIGHT: 150 LBS | DIASTOLIC BLOOD PRESSURE: 70 MMHG

## 2025-01-16 DIAGNOSIS — R04.0 EPISTAXIS: Primary | ICD-10-CM

## 2025-01-16 PROCEDURE — 99283 EMERGENCY DEPT VISIT LOW MDM: CPT

## 2025-01-16 PROCEDURE — 2500000003 HC RX 250 WO HCPCS: Performed by: STUDENT IN AN ORGANIZED HEALTH CARE EDUCATION/TRAINING PROGRAM

## 2025-01-16 RX ORDER — ASPIRIN 81 MG/1
81 TABLET, CHEWABLE ORAL DAILY
COMMUNITY

## 2025-01-16 RX ORDER — LEUPROLIDE ACETATE 45 MG
KIT SUBCUTANEOUS
COMMUNITY
Start: 2024-02-16

## 2025-01-16 RX ORDER — LEUPROLIDE ACETATE 45 MG
KIT INTRAMUSCULAR
COMMUNITY
Start: 2024-08-27

## 2025-01-16 RX ADMIN — PHENYLEPHRINE HYDROCHLORIDE 2 SPRAY: 0.5 SPRAY NASAL at 04:40

## 2025-01-16 ASSESSMENT — PAIN - FUNCTIONAL ASSESSMENT: PAIN_FUNCTIONAL_ASSESSMENT: NONE - DENIES PAIN

## 2025-01-16 NOTE — DISCHARGE INSTRUCTIONS
SUMMARY OF YOUR VISIT    Today you were seen for nosebleed that stopped once you placed the nasal clamp.  I discussed you do not have any bleeding here in the emergency department.  Especially in the winter it is common for nosebleeds to recur and therefore we have sent you home with the same nasal clamp to be used as needed for nosebleeds.  If your nosebleed is uncontrolled or if you have additional symptoms that are concerning I recommend return to the emerged department for reevaluation.    Otherwise recommend that you follow-up with your primary care provider for reevaluation to discuss ER visit.  I have provided you with information follow-up with the ear nose and throat doctor in the community to establish care to discuss recurrent nosebleeds.    If you have any new, changing, worsening, return or develop additional symptoms or concerns I recommend return to the Emergency Department for reevaluation.    Please continue to take your home medication as previously prescribed, I have made no changes to your home medications.        You can return to our or another Emergency Department as needed or for worsening symptoms of chest pain, shortness of breath, high fevers not relieved by acetaminophen (Tylenol) and/or ibuprofen (Motrin / Advil), chills, feeling of your heart fluttering or racing, persistent nausea and/or vomiting, vomiting up blood, blood in your stool, loss of consciousness, numbness, weakness or tingling in the arms or legs or change in color of the extremities, changes in mental status, persistent headache, blurry vision, loss of bladder / bowel control, if you are unable to follow up with your physician, or other any other care or concern.    Thank You!    On behalf of the Emergency Department staff and team, I would like to thank you for allowing us the opportunity to participate in your health care and evaluation today.

## 2025-01-16 NOTE — ED PROVIDER NOTES
[facility] EMERGENCY DEPARTMENT  Emergency Department Encounter  [facility]       Pt Name:Shiva Vance  MRN: [id number]  Birthdate 1935  Date of evaluation: 1/16/25  PCP:  Soy Rizzo MD      CHIEF COMPLAINT       Chief Complaint   Patient presents with    Epistaxis     Pt c/o nose bleed that began around 2am and has not stopped. Pt was seen last Saturday for same issue. Pt followed up with PCP and was told to just monitor. Pt states he does take baby aspirin daily.  Pt denies pain       HISTORY OF PRESENT ILLNESS     Shiva Vance is a 89 y.o. male who presents via personal vehicle with reports of epistaxis prior to arrival.  Patient has nasal clamp in place on arrival.  Hemostatic on arrival.  Reports similar episode about 1 week ago where he was evaluated in our emergency department with similar type episode.  No packing placed at that time.  Did follow-up with primary care provider.  He is not anticoagulated.  No trauma.  Hemostatic on arrival.  No systemic symptoms.    PAST MEDICAL / SURGICAL / SOCIAL / FAMILY HISTORY      has a past medical history of Prostate CA (HCC) and Seizures (HCC).       has no past surgical history on file.      Social History     Socioeconomic History    Marital status:      Spouse name: Not on file    Number of children: Not on file    Years of education: Not on file    Highest education level: Not on file   Occupational History    Not on file   Tobacco Use    Smoking status: Never     Passive exposure: Never    Smokeless tobacco: Never   Vaping Use    Vaping status: Never Used   Substance and Sexual Activity    Alcohol use: Never    Drug use: Never    Sexual activity: Not on file   Other Topics Concern    Not on file   Social History Narrative    Not on file     Social Determinants of Health     Financial Resource Strain: Not on file   Food Insecurity: Not on file (11/5/2023)   Transportation Needs: No Transportation Needs

## 2025-01-17 ENCOUNTER — HOSPITAL ENCOUNTER (EMERGENCY)
Age: 89
Discharge: HOME | End: 2025-01-17
Attending: EMERGENCY MEDICINE
Payer: MEDICARE

## 2025-01-17 VITALS
HEART RATE: 81 BPM | DIASTOLIC BLOOD PRESSURE: 73 MMHG | BODY MASS INDEX: 23.3 KG/M2 | HEIGHT: 66 IN | RESPIRATION RATE: 18 BRPM | TEMPERATURE: 96.8 F | OXYGEN SATURATION: 96 % | SYSTOLIC BLOOD PRESSURE: 137 MMHG | WEIGHT: 145 LBS

## 2025-01-17 DIAGNOSIS — R04.0 EPISTAXIS: Primary | ICD-10-CM

## 2025-01-17 LAB
ANION GAP SERPL CALCULATED.3IONS-SCNC: 9 MMOL/L (ref 9–17)
BASOPHILS # BLD: 0 K/UL (ref 0–0.2)
BASOPHILS NFR BLD: 1 % (ref 0–2)
BUN SERPL-MCNC: 21 MG/DL (ref 8–23)
CALCIUM SERPL-MCNC: 9.5 MG/DL (ref 8.6–10.4)
CHLORIDE SERPL-SCNC: 104 MMOL/L (ref 98–107)
CO2 SERPL-SCNC: 28 MMOL/L (ref 20–31)
CREAT SERPL-MCNC: 0.8 MG/DL (ref 0.7–1.2)
EOSINOPHIL # BLD: 0.1 K/UL (ref 0–0.4)
EOSINOPHILS RELATIVE PERCENT: 1 % (ref 1–4)
ERYTHROCYTE [DISTWIDTH] IN BLOOD BY AUTOMATED COUNT: 13.7 % (ref 12.5–15.4)
GFR, ESTIMATED: 85 ML/MIN/1.73M2
GLUCOSE SERPL-MCNC: 116 MG/DL (ref 70–99)
HCT VFR BLD AUTO: 36.4 % (ref 41–53)
HGB BLD-MCNC: 12.1 G/DL (ref 13.5–17.5)
INR PPP: 1.1 (ref 0.9–1.2)
LYMPHOCYTES NFR BLD: 0.9 K/UL (ref 1–4.8)
LYMPHOCYTES RELATIVE PERCENT: 15 % (ref 24–44)
MCH RBC QN AUTO: 31.4 PG (ref 26–34)
MCHC RBC AUTO-ENTMCNC: 33.1 G/DL (ref 31–37)
MCV RBC AUTO: 94.9 FL (ref 80–100)
MONOCYTES NFR BLD: 0.5 K/UL (ref 0.1–1.2)
MONOCYTES NFR BLD: 8 % (ref 2–11)
NEUTROPHILS NFR BLD: 75 % (ref 36–66)
NEUTS SEG NFR BLD: 4.6 K/UL (ref 1.8–7.7)
PLATELET # BLD AUTO: 140 K/UL (ref 140–450)
PMV BLD AUTO: 9.5 FL (ref 6–12)
POTASSIUM SERPL-SCNC: 4.4 MMOL/L (ref 3.7–5.3)
PROTHROMBIN TIME: 14.4 SEC (ref 11.8–14.6)
RBC # BLD AUTO: 3.84 M/UL (ref 4.5–5.9)
SODIUM SERPL-SCNC: 141 MMOL/L (ref 135–144)
WBC OTHER # BLD: 6.1 K/UL (ref 3.5–11)

## 2025-01-17 PROCEDURE — 36415 COLL VENOUS BLD VENIPUNCTURE: CPT

## 2025-01-17 PROCEDURE — 99283 EMERGENCY DEPT VISIT LOW MDM: CPT

## 2025-01-17 PROCEDURE — 85025 COMPLETE CBC W/AUTO DIFF WBC: CPT

## 2025-01-17 PROCEDURE — 30901 CONTROL OF NOSEBLEED: CPT

## 2025-01-17 PROCEDURE — 80048 BASIC METABOLIC PNL TOTAL CA: CPT

## 2025-01-17 PROCEDURE — 6370000000 HC RX 637 (ALT 250 FOR IP): Performed by: NURSE PRACTITIONER

## 2025-01-17 PROCEDURE — 85610 PROTHROMBIN TIME: CPT

## 2025-01-17 RX ORDER — CEPHALEXIN 500 MG/1
500 CAPSULE ORAL 3 TIMES DAILY
Qty: 15 CAPSULE | Refills: 0 | Status: SHIPPED | OUTPATIENT
Start: 2025-01-17 | End: 2025-01-22

## 2025-01-17 RX ORDER — ACETAMINOPHEN 325 MG/1
650 TABLET ORAL ONCE
Status: COMPLETED | OUTPATIENT
Start: 2025-01-17 | End: 2025-01-17

## 2025-01-17 RX ORDER — EPINEPHRINE 1 MG/ML
0.3 INJECTION, SOLUTION INTRAMUSCULAR; SUBCUTANEOUS ONCE
Status: DISCONTINUED | OUTPATIENT
Start: 2025-01-17 | End: 2025-01-17 | Stop reason: HOSPADM

## 2025-01-17 RX ADMIN — ACETAMINOPHEN 650 MG: 325 TABLET ORAL at 10:46

## 2025-01-17 RX ADMIN — CEPHALEXIN 500 MG: 250 CAPSULE ORAL at 10:46

## 2025-01-17 ASSESSMENT — PAIN SCALES - GENERAL
PAINLEVEL_OUTOF10: 3
PAINLEVEL_OUTOF10: 3

## 2025-01-17 ASSESSMENT — LIFESTYLE VARIABLES: HOW OFTEN DO YOU HAVE A DRINK CONTAINING ALCOHOL: NEVER

## 2025-01-17 ASSESSMENT — PAIN - FUNCTIONAL ASSESSMENT
PAIN_FUNCTIONAL_ASSESSMENT: 0-10
PAIN_FUNCTIONAL_ASSESSMENT: NONE - DENIES PAIN
PAIN_FUNCTIONAL_ASSESSMENT: 0-10

## 2025-01-17 ASSESSMENT — PAIN DESCRIPTION - LOCATION
LOCATION: HEAD
LOCATION: HEAD

## 2025-01-17 NOTE — DISCHARGE INSTRUCTIONS
Home. Antibiotics as prescribed.  Follow-up with ear nose and throat specialist.  Please call today to schedule an appointment early next week.  The packing will need to be removed in 3 to 5 days.  Return immediately for any blood that you feel draining down the back of your throat, recurrent nosebleeds that you cannot stop, headache, or any other worsening symptoms or concerns.

## 2025-01-17 NOTE — ED PROVIDER NOTES
[facility] EMERGENCY DEPARTMENT  eMERGENCY dEPARTMENT eNCOUnter   Independent Attestation     Pt Name: Shiva Vance  MRN: [id number]  Birthdate 1935  Date of evaluation: 1/17/25       Shiva Vance is a 89 y.o. male who presents with Epistaxis (Nosebleed on/off since yesterday; 3rd nosebleed in one week; takes ASA. Bleeding each time from Left nares.)        Based on the medical record, the care appears appropriate. I was personally available for consultation in the Emergency Department.    Ross Mcmahon DO  Attending Emergency  Physician                Ross Mcmahon DO  01/17/25 0953

## 2025-01-17 NOTE — ED PROVIDER NOTES
[facility] EMERGENCY DEPARTMENT  EMERGENCY DEPARTMENT ENCOUNTER      Pt Name: Shiva Vance  MRN: [id number]  Birthdate 1935  Date of evaluation: 1/17/2025  Provider: DONTAE Cruz CNP  10:39 AM    CHIEF COMPLAINT       Chief Complaint   Patient presents with    Epistaxis     Nosebleed on/off since yesterday; 3rd nosebleed in one week; takes ASA. Bleeding each time from Left nares.         HISTORY OF PRESENT ILLNESS    Shiva Vance is a 89 y.o. male who presents to the emergency department for evaluation of epistaxis.  Patient states for the past 3 days left nare will bleed intermittently.  He states he puts the nose clamp on for about 20 minutes he gets half an hour of relief and then it will start up again.  He was up all night with the same problem last night.  He is only on aspirin.  States he had this problem about 30 years ago and went to an ENT at that point.  Denies any falls or trauma has not recently been ill    HPI    Nursing Notes were reviewed.    REVIEW OF SYSTEMS       Review of Systems   All other systems reviewed and are negative.      Except as noted above the remainder of the review of systems was reviewed and negative.       PAST MEDICAL HISTORY     Past Medical History:   Diagnosis Date    Nosebleed     Prostate CA (HCC)     Seizures (HCC)          SURGICAL HISTORY       Past Surgical History:   Procedure Laterality Date    JOINT REPLACEMENT      PROSTATECTOMY           CURRENT MEDICATIONS       Current Discharge Medication List        CONTINUE these medications which have NOT CHANGED    Details   leuprolide acetate, 6 Month, (ELIGARD) 45 MG injection Inject into the skin      leuprolide (LUPRON DEPOT, 6-MONTH,) 45 MG injection Inject 45 mg by intramuscular route every 6 months      aspirin 81 MG chewable tablet Take 1 tablet by mouth daily      ammonium lactate (LAC-HYDRIN) 12 % lotion       ciclopirox (LOPROX) 0.77 % cream       levETIRAcetam (KEPPRA) 500 MG tablet            PATIENT REFERRED TO:  Soy Rizzo MD  [address]  [address]  [place] [address]  [phone]    In 2 days      [facility] Emergency Department  [address]  [facility] [address] [phone]    If symptoms worsen    Asad Woods MD  [address] [facility] [address] [place] [address] [id number] [phone]    On 1/20/2025  Call today for a follow-up appointment early next week to have the packing removed      DISCHARGE MEDICATIONS:  Current Discharge Medication List        START taking these medications    Details   cephALEXin (KEFLEX) 500 MG capsule Take 1 capsule by mouth 3 times daily for 5 days  Qty: 15 capsule, Refills: 0           Controlled Substances Monitoring:          No data to display                (Please note that portions of this note were completed with a voice recognition program.  Efforts were made to edit the dictations but occasionally words are mis-transcribed.)    DONTAE Cruz CNP (electronically signed)             Gina East APRN - CNP  01/17/25 1042